# Patient Record
Sex: MALE | Race: WHITE | Employment: FULL TIME | URBAN - NONMETROPOLITAN AREA
[De-identification: names, ages, dates, MRNs, and addresses within clinical notes are randomized per-mention and may not be internally consistent; named-entity substitution may affect disease eponyms.]

---
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
## 2019-05-19 LAB
ALBUMIN SERPL-MCNC: 4.3 G/DL
ALP BLD-CCNC: 112 U/L
ALT SERPL-CCNC: 10 U/L
ANION GAP SERPL CALCULATED.3IONS-SCNC: NORMAL MMOL/L
AST SERPL-CCNC: 22 U/L
BASOPHILS ABSOLUTE: 0 /ΜL
BASOPHILS RELATIVE PERCENT: 0 %
BILIRUB SERPL-MCNC: NORMAL MG/DL (ref 0.1–1.4)
BUN / CREAT RATIO: NORMAL
BUN BLDV-MCNC: 8 MG/DL
BUN BLDV-MCNC: 8 MG/DL
CALCIUM SERPL-MCNC: NORMAL MG/DL
CHLORIDE BLD-SCNC: 103 MMOL/L
CO2: 29 MMOL/L
CREAT SERPL-MCNC: 0.9 MG/DL
CREAT SERPL-MCNC: 0.9 MG/DL
EOSINOPHILS ABSOLUTE: 0 /ΜL
EOSINOPHILS RELATIVE PERCENT: 0 %
GFR CALCULATED: NORMAL
GLUCOSE BLD-MCNC: 116 MG/DL
GLUCOSE FASTING: 116 MG/DL
HCT VFR BLD CALC: 42.5 % (ref 41–53)
HEMOGLOBIN: 15 G/DL (ref 13.5–17.5)
LYMPHOCYTES ABSOLUTE: 1.4 /ΜL
LYMPHOCYTES RELATIVE PERCENT: 0.1 %
MCH RBC QN AUTO: 30.4 PG
MCHC RBC AUTO-ENTMCNC: 35.3 G/DL
MCV RBC AUTO: 86 FL
MONOCYTES ABSOLUTE: 1.1 /ΜL
MONOCYTES RELATIVE PERCENT: 13.6 %
NEUTROPHILS ABSOLUTE: 7.6 /ΜL
NEUTROPHILS RELATIVE PERCENT: 75.3 %
PDW BLD-RTO: 11.4 %
PLATELET # BLD: 198 K/ΜL
PMV BLD AUTO: 10.1 FL
POTASSIUM SERPL-SCNC: 3.4 MMOL/L
RBC # BLD: 4.94 10^6/ΜL
SODIUM BLD-SCNC: 140 MMOL/L
TOTAL PROTEIN: 7.9
WBC # BLD: 10.1 10^3/ML

## 2021-07-29 ENCOUNTER — OFFICE VISIT (OUTPATIENT)
Dept: FAMILY MEDICINE CLINIC | Age: 21
End: 2021-07-29
Payer: COMMERCIAL

## 2021-07-29 VITALS
BODY MASS INDEX: 25.07 KG/M2 | SYSTOLIC BLOOD PRESSURE: 130 MMHG | OXYGEN SATURATION: 98 % | HEIGHT: 71 IN | HEART RATE: 67 BPM | WEIGHT: 179.1 LBS | DIASTOLIC BLOOD PRESSURE: 80 MMHG

## 2021-07-29 DIAGNOSIS — K21.9 GASTROESOPHAGEAL REFLUX DISEASE, UNSPECIFIED WHETHER ESOPHAGITIS PRESENT: ICD-10-CM

## 2021-07-29 DIAGNOSIS — Z76.89 ENCOUNTER TO ESTABLISH CARE: Primary | ICD-10-CM

## 2021-07-29 DIAGNOSIS — Z13.31 POSITIVE DEPRESSION SCREENING: ICD-10-CM

## 2021-07-29 DIAGNOSIS — F41.9 ANXIETY: ICD-10-CM

## 2021-07-29 PROCEDURE — 99204 OFFICE O/P NEW MOD 45 MIN: CPT | Performed by: NURSE PRACTITIONER

## 2021-07-29 PROCEDURE — G8431 POS CLIN DEPRES SCRN F/U DOC: HCPCS | Performed by: NURSE PRACTITIONER

## 2021-07-29 RX ORDER — FLUOXETINE HYDROCHLORIDE 20 MG/1
20 CAPSULE ORAL DAILY
Qty: 30 CAPSULE | Refills: 3 | Status: SHIPPED | OUTPATIENT
Start: 2021-07-29 | End: 2022-01-20

## 2021-07-29 RX ORDER — OMEPRAZOLE 20 MG/1
20 CAPSULE, DELAYED RELEASE ORAL DAILY
Qty: 30 CAPSULE | Refills: 3 | Status: SHIPPED | OUTPATIENT
Start: 2021-07-29 | End: 2022-09-27 | Stop reason: ALTCHOICE

## 2021-07-29 SDOH — ECONOMIC STABILITY: FOOD INSECURITY: WITHIN THE PAST 12 MONTHS, THE FOOD YOU BOUGHT JUST DIDN'T LAST AND YOU DIDN'T HAVE MONEY TO GET MORE.: NEVER TRUE

## 2021-07-29 SDOH — ECONOMIC STABILITY: FOOD INSECURITY: WITHIN THE PAST 12 MONTHS, YOU WORRIED THAT YOUR FOOD WOULD RUN OUT BEFORE YOU GOT MONEY TO BUY MORE.: NEVER TRUE

## 2021-07-29 ASSESSMENT — PATIENT HEALTH QUESTIONNAIRE - PHQ9
4. FEELING TIRED OR HAVING LITTLE ENERGY: 2
7. TROUBLE CONCENTRATING ON THINGS, SUCH AS READING THE NEWSPAPER OR WATCHING TELEVISION: 2
2. FEELING DOWN, DEPRESSED OR HOPELESS: 2
SUM OF ALL RESPONSES TO PHQ QUESTIONS 1-9: 12
5. POOR APPETITE OR OVEREATING: 2
8. MOVING OR SPEAKING SO SLOWLY THAT OTHER PEOPLE COULD HAVE NOTICED. OR THE OPPOSITE, BEING SO FIGETY OR RESTLESS THAT YOU HAVE BEEN MOVING AROUND A LOT MORE THAN USUAL: 0
SUM OF ALL RESPONSES TO PHQ QUESTIONS 1-9: 12
3. TROUBLE FALLING OR STAYING ASLEEP: 0
9. THOUGHTS THAT YOU WOULD BE BETTER OFF DEAD, OR OF HURTING YOURSELF: 0
SUM OF ALL RESPONSES TO PHQ9 QUESTIONS 1 & 2: 4
1. LITTLE INTEREST OR PLEASURE IN DOING THINGS: 2
10. IF YOU CHECKED OFF ANY PROBLEMS, HOW DIFFICULT HAVE THESE PROBLEMS MADE IT FOR YOU TO DO YOUR WORK, TAKE CARE OF THINGS AT HOME, OR GET ALONG WITH OTHER PEOPLE: 0
SUM OF ALL RESPONSES TO PHQ QUESTIONS 1-9: 12
6. FEELING BAD ABOUT YOURSELF - OR THAT YOU ARE A FAILURE OR HAVE LET YOURSELF OR YOUR FAMILY DOWN: 2

## 2021-07-29 ASSESSMENT — COLUMBIA-SUICIDE SEVERITY RATING SCALE - C-SSRS
1. WITHIN THE PAST MONTH, HAVE YOU WISHED YOU WERE DEAD OR WISHED YOU COULD GO TO SLEEP AND NOT WAKE UP?: NO
2. HAVE YOU ACTUALLY HAD ANY THOUGHTS OF KILLING YOURSELF?: NO
6. HAVE YOU EVER DONE ANYTHING, STARTED TO DO ANYTHING, OR PREPARED TO DO ANYTHING TO END YOUR LIFE?: NO

## 2021-07-29 ASSESSMENT — ENCOUNTER SYMPTOMS
WHEEZING: 0
BELCHING: 1
HEARTBURN: 0
NAUSEA: 1
ABDOMINAL PAIN: 1
SORE THROAT: 0
COUGH: 0

## 2021-07-29 ASSESSMENT — SOCIAL DETERMINANTS OF HEALTH (SDOH): HOW HARD IS IT FOR YOU TO PAY FOR THE VERY BASICS LIKE FOOD, HOUSING, MEDICAL CARE, AND HEATING?: NOT HARD AT ALL

## 2021-07-29 NOTE — PATIENT INSTRUCTIONS

## 2021-07-29 NOTE — PROGRESS NOTES
[address] [facility]  [address]  Dept: [phone]  Dept Fax: [phone]    Chief Complaint   Patient presents with    New Patient     states has some weeks were he feels good and other weeks not so much finds he isn't motiviated, states on his good days will go to work, then work out and feels motivated. HPI:   Patient presents today to establish care. He has a past medical history of focal seizure, last seizure 10 years ago. States he was previously on Trileptal, and discharged from his neurologist. He works [employer] full time. He works out at [employer] in [employer] 2-3 times weekly. Gastroesophageal Reflux  He complains of abdominal pain (upper), belching, early satiety and nausea. He reports no chest pain, no coughing, no heartburn, no sore throat or no wheezing. This is a new problem. Episode onset: 2 months. The problem occurs frequently. The problem has been unchanged. The symptoms are aggravated by ETOH, smoking and certain foods (occasional). Associated symptoms include fatigue. Risk factors include ETOH use and smoking/tobacco exposure. Treatments tried: GasX. The treatment provided moderate relief. Mental Health Problem   The primary symptoms include: depression and anxiety. he has the following symptoms: fatigue and irritable. Onset of symptoms was approximately 6 months ago. Symptoms have been unchanged since that time. he denies current suicidal and homicidal ideation. Family history significant for anxiety. Risk factors: positive family history in  mother. Previous treatment includes: none. Appetite: fluctuates  Sleep disturbance: No  Fatigue: Yes  Loss of pleasure: sometimes  Impulsive behavior: Yes  Memory: recent and remote memory intact  Attention/Concentration: intact  Suicide Assessment: no suicidal ideation  Has never had counseling.     BP Readings from Last 3 Encounters:   07/29/21 130/80   06/13/17 138/72        Pulse Readings from Last 3 Encounters:   07/29/21 67   06/13/17 100        Wt Readings from Last 3 Encounters:   07/29/21 179 lb 1.6 oz (81.2 kg)   06/13/17 177 lb (80.3 kg) (81 %, Z= 0.88)*     * Growth percentiles are based on [facility] (Boys, 2-20 Years) data. Current Outpatient Medications   Medication Sig Dispense Refill    FLUoxetine (PROZAC) 20 MG capsule Take 1 capsule by mouth daily 30 capsule 3    omeprazole (PRILOSEC) 20 MG delayed release capsule Take 1 capsule by mouth daily 30 capsule 3    naproxen (NAPROSYN) 500 MG tablet Take 1 tablet by mouth 2 times daily (with meals) 60 tablet 1     No current facility-administered medications for this visit. No past medical history on file. No past surgical history on file. Family History   Problem Relation Age of Onset    High Blood Pressure Mother     High Blood Pressure Brother      Social History     Socioeconomic History    Marital status: Single     Spouse name: Not on file    Number of children: Not on file    Years of education: Not on file    Highest education level: Not on file   Occupational History    Not on file   Tobacco Use    Smoking status: Never Smoker    Smokeless tobacco: Never Used   Substance and Sexual Activity    Alcohol use: No    Drug use: No    Sexual activity: Not on file   Other Topics Concern    Not on file   Social History Narrative    Not on file     Social Determinants of Health     Financial Resource Strain: Low Risk     Difficulty of Paying Living Expenses: Not hard at all   Food Insecurity: No Food Insecurity    Worried About Running Out of Food in the Last Year: Never true    Lisa of Food in the Last Year: Never true   Transportation Needs:     Lack of Transportation (Medical):      Lack of Transportation (Non-Medical):    Physical Activity:     Days of Exercise per Week:     Minutes of Exercise per Session:    Stress:     Feeling of Stress :    Social Connections:     Frequency of Communication with Friends and Family:     Frequency of Social Gatherings with Friends and Family:     Attends Buddhist Services:     Active Member of Clubs or Organizations:     Attends Club or Organization Meetings:     Marital Status:    Intimate Partner Violence:     Fear of Current or Ex-Partner:     Emotionally Abused:     Physically Abused:     Sexually Abused: Allergies   Allergen Reactions    Penicillin G      Other reaction(s): Unknown       There is no problem list on file for this patient. Health Maintenance   Topic Date Due    Hepatitis C screen  Never done    Varicella vaccine (1 of 2 - 2-dose childhood series) Never done    HPV vaccine (1 - Male 2-dose series) Never done    COVID-19 Vaccine (1) Never done    HIV screen  Never done    DTaP/Tdap/Td vaccine (1 - Tdap) Never done    Flu vaccine (1) 09/01/2021    Hepatitis A vaccine  Aged Out    Hepatitis B vaccine  Aged Out    Hib vaccine  Aged Out    Meningococcal (ACWY) vaccine  Aged Out    Pneumococcal 0-64 years Vaccine  Aged Out       Subjective:     Review of Systems   Constitutional: Positive for fatigue. Negative for chills and fever. HENT: Negative. Negative for sore throat. Respiratory: Negative for cough, shortness of breath and wheezing. Cardiovascular: Negative for chest pain, palpitations and leg swelling. Gastrointestinal: Positive for abdominal pain (upper) and nausea. Negative for constipation, diarrhea and heartburn. Endocrine: Negative for cold intolerance, heat intolerance, polydipsia, polyphagia and polyuria. Genitourinary: Negative for difficulty urinating. Musculoskeletal: Negative for arthralgias, myalgias and neck pain. Neurological: Negative for dizziness, weakness and headaches. Psychiatric/Behavioral: Positive for agitation and dysphoric mood. Negative for self-injury, sleep disturbance and suicidal ideas. The patient is nervous/anxious.         Objective:     Vitals: 07/29/21 0803   BP: 130/80   Pulse: 67   SpO2: 98%   Weight: 179 lb 1.6 oz (81.2 kg)   Height: 5' 11.26\" (1.81 m)        Estimated body mass index is 24.8 kg/m² as calculated from the following:    Height as of this encounter: 5' 11.26\" (1.81 m). Weight as of this encounter: 179 lb 1.6 oz (81.2 kg). Physical Exam  Constitutional:       Appearance: Normal appearance. He is well-developed and well-groomed. HENT:      Head: Normocephalic. Right Ear: Tympanic membrane and ear canal normal.      Left Ear: Tympanic membrane and ear canal normal.      Nose: Nose normal.      Mouth/Throat:      Mouth: Mucous membranes are moist.   Eyes:      Conjunctiva/sclera: Conjunctivae normal.      Pupils: Pupils are equal, round, and reactive to light. Neck:      Thyroid: No thyromegaly. Cardiovascular:      Rate and Rhythm: Normal rate and regular rhythm. Heart sounds: Normal heart sounds. Pulmonary:      Effort: Pulmonary effort is normal.      Breath sounds: Normal breath sounds. No wheezing. Abdominal:      General: Bowel sounds are normal.      Palpations: Abdomen is soft. Tenderness: There is abdominal tenderness in the epigastric area. Musculoskeletal:      Cervical back: Neck supple. Right lower leg: No edema. Left lower leg: No edema. Lymphadenopathy:      Cervical: No cervical adenopathy. Skin:     Capillary Refill: Capillary refill takes less than 2 seconds. Neurological:      Mental Status: He is alert and oriented to person, place, and time. Psychiatric:         Mood and Affect: Mood is anxious and depressed. Behavior: Behavior is cooperative. PHQ Scores 7/29/2021   PHQ2 Score 4   PHQ9 Score 12     Interpretation of Total Score Depression Severity: 1-4 = Minimal depression, 5-9 = Mild depression, 10-14 = Moderate depression, 15-19 = Moderately severe depression, 20-27 = Severe depression     Assessment:     1. Encounter to establish care    2.  Anxiety 3. Gastroesophageal reflux disease, unspecified whether esophagitis present    4. Positive depression screening        Plan:     Orders Placed This Encounter   Medications    FLUoxetine (PROZAC) 20 MG capsule     Sig: Take 1 capsule by mouth daily     Dispense:  30 capsule     Refill:  3    omeprazole (PRILOSEC) 20 MG delayed release capsule     Sig: Take 1 capsule by mouth daily     Dispense:  30 capsule     Refill:  3       Orders Placed This Encounter   Procedures    Positive Screen for Clinical Depression with a Documented Follow-up Plan      Instructed to start taking fluoxetine 20 mg daily, and omeprazole 20 mg daily. Encouraged healthy diet and routine exercise. Instructed to continue current medications. All patient questions answered. Pt voiced understanding. Health Maintenance reviewed. Patient agreed with treatment plan. Follow up as directed. Return in about 4 weeks (around 8/26/2021). This note was generated completely or in part utilizing Dragon dictation speech recognition software. Occasionally, words are mistranscribed and despite editing, the text may contain inaccuracies due to incorrect word recognition. If further clarification is needed please contact the office at [phone].     Electronically signed by TATA Feliciano CNP on 8/6/2021 at 8:49 PM.

## 2021-08-06 ASSESSMENT — ENCOUNTER SYMPTOMS
CONSTIPATION: 0
SHORTNESS OF BREATH: 0
DIARRHEA: 0

## 2021-08-11 ENCOUNTER — NURSE TRIAGE (OUTPATIENT)
Dept: OTHER | Facility: CLINIC | Age: 21
End: 2021-08-11

## 2021-08-11 NOTE — TELEPHONE ENCOUNTER
Received call from [facility]  at [facility] with [employer] Complaint. Brief description of triage: Mom called in for son who is not with her about abd pain that pt has been having and weight loss. Mom was calling to reschedule an appt he already has and was sent to NT to do assessment. Mom disconnected prior to being transferred. Writer attempted to call pt back at his number and he did not answer and mailbox was full. NO TRIAGE AT THIS TIME>     Attention Provider: Thank you for allowing me to participate in the care of your patient. The patient was connected to triage in response to information provided to the [facility]. Please do not respond through this encounter as the response is not directed to a shared pool.         Reason for Disposition   Caller has cancelled the call before the first contact    Protocols used: NO CONTACT OR DUPLICATE CONTACT CALL-ADULT-

## 2021-08-17 PROBLEM — F41.8 MIXED ANXIETY AND DEPRESSIVE DISORDER: Status: ACTIVE | Noted: 2021-08-17

## 2021-08-17 PROBLEM — K21.9 GASTROESOPHAGEAL REFLUX DISEASE: Status: ACTIVE | Noted: 2021-08-17

## 2021-08-17 NOTE — PROGRESS NOTES
[address] [address] [facility]  Dept: [phone]  Dept Fax: [phone]    Tomeka Ramos is a 19 y.o. male who presents today for his medical conditions/complaints as noted below. Tomeka Ramos c/o of Follow-up (gerd and anxiety last visit instructed to Instructed to start taking fluoxetine 20 mg daily, and omeprazole 20 mg daily states meds have been helpful but does have some stomach issues in morning and then will subside but then will come back appetite diminished some )      HPI:   Patient presents to the office for 4-week follow-up of anxiety, depression, and GERD. He was started on fluoxetine 20 mg daily, and omeprazole 20 mg daily. Patient states he took the omeprazole for 1 week and then stopped after symptoms did not improve. Today he reports feeling better with taking the fluoxetine. He still having nausea in the morning which typically last 1 to 2 minutes. Mental Health Problem   The primary symptoms include dysphoric mood. he has the following symptoms: irritable and nausea. Onset of symptoms was approximately 7 months ago. Symptoms have been unchanged since that time. he denies current suicidal and homicidal ideation. Family history significant for anxiety. Risk factors: positive family history in  mother. Current treatment includes Prozac. he complains of the following medication side effects: none.     Appetite: decreased  Sleep disturbance: No  Fatigue: No  Loss of pleasure: sometimes  Impulsive behavior: No  Memory: recent and remote memory intact  Attention/Concentration: intact  Suicide Assessment: no suicidal ideation      BP Readings from Last 3 Encounters:   08/18/21 136/86   07/29/21 130/80   06/13/17 138/72              ([id number])    Pulse Readings from Last 3 Encounters:   08/18/21 56   07/29/21 67   06/13/17 100        Wt Readings from Last 3 Encounters:   08/18/21 176 lb 11.2 oz (80.2 kg)   07/29/21 179 lb 1.6 oz (81.2 kg)   06/13/17 177 lb (80.3 kg) (81 %, Z= 0.88)*     * Growth percentiles are based on CDC (Boys, 2-20 Years) data. History reviewed. No pertinent past medical history. No past surgical history on file. Family History   Problem Relation Age of Onset    High Blood Pressure Mother     High Blood Pressure Brother        Social History     Tobacco Use    Smoking status: Never Smoker    Smokeless tobacco: Never Used   Substance Use Topics    Alcohol use: No      Current Outpatient Medications   Medication Sig Dispense Refill    FLUoxetine (PROZAC) 20 MG capsule Take 1 capsule by mouth daily 30 capsule 3    omeprazole (PRILOSEC) 20 MG delayed release capsule Take 1 capsule by mouth daily 30 capsule 3    naproxen (NAPROSYN) 500 MG tablet Take 1 tablet by mouth 2 times daily (with meals) 60 tablet 1     No current facility-administered medications for this visit. Allergies   Allergen Reactions    Penicillin G      Other reaction(s): Unknown       Health Maintenance   Topic Date Due    Hepatitis C screen  Never done    Varicella vaccine (1 of 2 - 2-dose childhood series) Never done    HPV vaccine (1 - Male 2-dose series) Never done    COVID-19 Vaccine (1) Never done    HIV screen  Never done    DTaP/Tdap/Td vaccine (1 - Tdap) Never done    Flu vaccine (1) 09/01/2021    Hepatitis A vaccine  Aged Out    Hepatitis B vaccine  Aged Out    Hib vaccine  Aged Out    Meningococcal (ACWY) vaccine  Aged Out    Pneumococcal 0-64 years Vaccine  Aged Out       Subjective:      Review of Systems   Constitutional: Positive for appetite change (decreased). Negative for chills, fatigue and fever. HENT: Negative. Eyes: Negative. Respiratory: Negative for cough, shortness of breath and wheezing. Cardiovascular: Negative for chest pain, palpitations and leg swelling. Gastrointestinal: Positive for nausea (in the morning). Negative for abdominal pain, constipation and diarrhea. Genitourinary: Negative. Musculoskeletal: Negative for arthralgias and myalgias. Skin: Negative. Allergic/Immunologic: Negative for environmental allergies and food allergies. Neurological: Negative for dizziness, weakness, light-headedness and headaches. Psychiatric/Behavioral: Positive for agitation and dysphoric mood. Negative for self-injury, sleep disturbance and suicidal ideas. The patient is nervous/anxious. Objective:     Vitals:    08/18/21 1712   BP: 136/86   Pulse: 56   SpO2: 98%   Weight: 176 lb 11.2 oz (80.2 kg)        Estimated body mass index is 24.47 kg/m² as calculated from the following:    Height as of 7/29/21: 5' 11.26\" (1.81 m). Weight as of this encounter: 176 lb 11.2 oz (80.2 kg). Physical Exam  Constitutional:       Appearance: Normal appearance. He is well-developed and well-groomed. HENT:      Head: Normocephalic. Eyes:      Conjunctiva/sclera: Conjunctivae normal.   Neck:      Thyroid: No thyromegaly. Cardiovascular:      Rate and Rhythm: Normal rate and regular rhythm. Heart sounds: Normal heart sounds. Pulmonary:      Effort: Pulmonary effort is normal.      Breath sounds: Normal breath sounds. No wheezing. Abdominal:      General: Bowel sounds are normal.      Palpations: Abdomen is soft. Tenderness: There is no abdominal tenderness. Musculoskeletal:      Cervical back: Neck supple. Right lower leg: No edema. Left lower leg: No edema. Lymphadenopathy:      Cervical: No cervical adenopathy. Skin:     Capillary Refill: Capillary refill takes less than 2 seconds. Neurological:      Mental Status: He is alert and oriented to person, place, and time. Psychiatric:         Mood and Affect: Mood is anxious and depressed. Behavior: Behavior is cooperative.          PHQ Scores 7/29/2021   PHQ2 Score 4   PHQ9 Score 12     Interpretation of Total Score Depression Severity: 1-4 = Minimal depression, 5-9 = Mild depression, 10-14 = Moderate depression, 15-19 = Moderately severe depression, 20-27 = Severe depression     Lab Results   Component Value Date     05/19/2019    K 3.4 05/19/2019     05/19/2019    CO2 29 05/19/2019    BUN 8 05/19/2019    BUN 8 05/19/2019    CREATININE 0.90 05/19/2019    CREATININE 0.90 05/19/2019    GLUCOSE 116 05/19/2019    LABALBU 4.3 05/19/2019    ALKPHOS 112 05/19/2019    AST 22 05/19/2019    ALT 10 05/19/2019     No results found for: LABA1C    Lab Results   Component Value Date    GLUF 116 05/19/2019    CREATININE 0.90 05/19/2019    CREATININE 0.90 05/19/2019       Assessment:     1. Mixed anxiety and depressive disorder    2. Gastroesophageal reflux disease, unspecified whether esophagitis present        Plan:     Overall patient reports feeling better with taking fluoxetine. Instructed to continue taking medication, including omeprazole 20 mg daily 30 minutes before breakfast.  Discussed use, benefit, and side effects of prescribed medications. All patient questions answered. Patient voiced understanding. Health Maintenance reviewed. Instructed to continue current medications, diet and exercise. Patient agreed with treatment plan. Follow up as directed. Return if symptoms worsen or fail to improve. This note was generated completely or in part utilizing Dragon dictation speech recognition software. Occasionally, words are mistranscribed and despite editing, the text may contain inaccuracies due to incorrect word recognition. If further clarification is needed please contact the office at [phone].     Electronically signed by TATA Amaral CNP on 8/25/2021

## 2021-08-18 ENCOUNTER — OFFICE VISIT (OUTPATIENT)
Dept: FAMILY MEDICINE CLINIC | Age: 21
End: 2021-08-18
Payer: COMMERCIAL

## 2021-08-18 VITALS
SYSTOLIC BLOOD PRESSURE: 136 MMHG | HEART RATE: 56 BPM | WEIGHT: 176.7 LBS | BODY MASS INDEX: 24.47 KG/M2 | OXYGEN SATURATION: 98 % | DIASTOLIC BLOOD PRESSURE: 86 MMHG

## 2021-08-18 DIAGNOSIS — K21.9 GASTROESOPHAGEAL REFLUX DISEASE, UNSPECIFIED WHETHER ESOPHAGITIS PRESENT: ICD-10-CM

## 2021-08-18 DIAGNOSIS — F41.8 MIXED ANXIETY AND DEPRESSIVE DISORDER: Primary | ICD-10-CM

## 2021-08-18 PROCEDURE — 99214 OFFICE O/P EST MOD 30 MIN: CPT | Performed by: NURSE PRACTITIONER

## 2021-08-25 ASSESSMENT — ENCOUNTER SYMPTOMS
NAUSEA: 1
DIARRHEA: 0
EYES NEGATIVE: 1
CONSTIPATION: 0
COUGH: 0
SHORTNESS OF BREATH: 0
WHEEZING: 0
ABDOMINAL PAIN: 0

## 2021-09-22 LAB
ANION GAP SERPL CALCULATED.3IONS-SCNC: 15.7 MMOL/L
BASOPHILS %: 0.89 (ref 0–3)
BASOPHILS ABSOLUTE: 0.05 (ref 0–0.3)
BUN BLDV-MCNC: 15 MG/DL (ref 9–20)
CALCIUM SERPL-MCNC: 10.2 MG/DL (ref 8.4–10.2)
CHLORIDE BLD-SCNC: 102 MMOL/L (ref 98–120)
CO2: 30 MMOL/L (ref 22–31)
CREAT SERPL-MCNC: 0.9 MG/DL (ref 0.7–1.3)
EOSINOPHILS %: 0.42 (ref 0–10)
EOSINOPHILS ABSOLUTE: 0.02 (ref 0–1.1)
GFR CALCULATED: > 60
GLUCOSE: 91 MG/DL (ref 75–110)
HCT VFR BLD CALC: 45.9 % (ref 42–52)
HEMOGLOBIN: 15.8 (ref 13.8–17.8)
LYMPHOCYTE %: 30.38 (ref 20–51.1)
LYMPHOCYTES ABSOLUTE: 1.63 (ref 1–5.5)
MCH RBC QN AUTO: 29.9 PG (ref 28.5–32.5)
MCHC RBC AUTO-ENTMCNC: 34.5 G/DL (ref 32–37)
MCV RBC AUTO: 86.5 FL (ref 80–94)
MONOCYTES %: 10.44 (ref 1.7–9.3)
MONOCYTES ABSOLUTE: 0.56 (ref 0.1–1)
NEUTROPHILS %: 57.87 (ref 42.2–75.2)
NEUTROPHILS ABSOLUTE: 3.11 (ref 2–8.1)
PDW BLD-RTO: 10.9 % (ref 10–15.5)
PLATELET # BLD: 222.2 THOU/MM3 (ref 130–400)
POTASSIUM SERPL-SCNC: 5.7 MMOL/L (ref 3.6–5)
RBC: 5.31 M/UL (ref 4.7–6.1)
SODIUM BLD-SCNC: 142 MMOL/L (ref 135–145)
TSH SERPL DL<=0.05 MIU/L-ACNC: 0.41 MIU/ML (ref 0.49–4.67)
WBC: 5.4 THOU/ML3 (ref 4.8–10.8)

## 2021-09-28 DIAGNOSIS — E87.5 HYPERKALEMIA: Primary | ICD-10-CM

## 2021-09-28 DIAGNOSIS — R79.89 ABNORMAL TSH: ICD-10-CM

## 2021-10-07 LAB
POTASSIUM SERPL-SCNC: 4.4 MMOL/L (ref 3.6–5)
T3 TOTAL: 110 NG/DL (ref 60–181)
T4 FREE: 0.94 NG/DL (ref 0.78–2.19)
TSH SERPL DL<=0.05 MIU/L-ACNC: 0.74 MIU/ML (ref 0.49–4.67)

## 2022-01-20 DIAGNOSIS — F41.9 ANXIETY: ICD-10-CM

## 2022-01-20 RX ORDER — FLUOXETINE HYDROCHLORIDE 20 MG/1
CAPSULE ORAL
Qty: 30 CAPSULE | Refills: 3 | Status: SHIPPED | OUTPATIENT
Start: 2022-01-20 | End: 2022-03-22 | Stop reason: ALTCHOICE

## 2022-01-20 NOTE — TELEPHONE ENCOUNTER
Aroldo Patel is requesting a refill on the following medication(s):  Requested Prescriptions     Pending Prescriptions Disp Refills    FLUoxetine (PROZAC) 20 MG capsule [Pharmacy Med Name: FLUOXETINE HCL 20 MG CAPSULE] 30 capsule 3     Sig: take 1 capsule by mouth once daily       Last Visit Date (If Applicable):  4/86/4714    Next Visit Date:    Visit date not found

## 2022-03-22 ENCOUNTER — OFFICE VISIT (OUTPATIENT)
Dept: FAMILY MEDICINE CLINIC | Age: 22
End: 2022-03-22
Payer: COMMERCIAL

## 2022-03-22 VITALS
SYSTOLIC BLOOD PRESSURE: 132 MMHG | WEIGHT: 195.5 LBS | DIASTOLIC BLOOD PRESSURE: 78 MMHG | BODY MASS INDEX: 27.07 KG/M2 | HEART RATE: 69 BPM | OXYGEN SATURATION: 97 %

## 2022-03-22 DIAGNOSIS — K21.9 GASTROESOPHAGEAL REFLUX DISEASE, UNSPECIFIED WHETHER ESOPHAGITIS PRESENT: ICD-10-CM

## 2022-03-22 DIAGNOSIS — F41.8 MIXED ANXIETY AND DEPRESSIVE DISORDER: Primary | ICD-10-CM

## 2022-03-22 DIAGNOSIS — G25.81 RESTLESS LEGS: ICD-10-CM

## 2022-03-22 PROCEDURE — 99213 OFFICE O/P EST LOW 20 MIN: CPT | Performed by: NURSE PRACTITIONER

## 2022-03-22 PROCEDURE — 99212 OFFICE O/P EST SF 10 MIN: CPT | Performed by: NURSE PRACTITIONER

## 2022-03-22 ASSESSMENT — ENCOUNTER SYMPTOMS
BELCHING: 0
CONSTIPATION: 0
NAUSEA: 0
ABDOMINAL PAIN: 0
WHEEZING: 0
SHORTNESS OF BREATH: 0
DIARRHEA: 0
SORE THROAT: 0
COUGH: 0
HEARTBURN: 0

## 2022-03-22 ASSESSMENT — PATIENT HEALTH QUESTIONNAIRE - PHQ9
SUM OF ALL RESPONSES TO PHQ9 QUESTIONS 1 & 2: 0
1. LITTLE INTEREST OR PLEASURE IN DOING THINGS: 0
SUM OF ALL RESPONSES TO PHQ QUESTIONS 1-9: 0
SUM OF ALL RESPONSES TO PHQ QUESTIONS 1-9: 0
2. FEELING DOWN, DEPRESSED OR HOPELESS: 0
SUM OF ALL RESPONSES TO PHQ QUESTIONS 1-9: 0
SUM OF ALL RESPONSES TO PHQ QUESTIONS 1-9: 0

## 2022-03-22 NOTE — PROGRESS NOTES
[address] [address] [address]  Dept: [phone]  Dept Fax: [phone]    History and Physical  Patient:  Karly Torres  YOB: 2000  Date of Service:  3/22/2022    Subjective:   Karly Torres (:  2000) is a 19 y.o. male, Established patient, here for evaluation of the following chief complaint(s):    Chief Complaint   Patient presents with    Gastroesophageal Reflux     states since starting omeprazole symptoms have improved tremendously     Anxiety     reports doing well states fluoxetine had helped but does not feel as effective    Depression    Other     having issues with RLS at night says has the feeling of having to constantly move legs no cramping      States he felt much better, more relaxed the first month he was taking fluoxetine. He is not so sure it is working as well anymore. He is also feeling like he has to move his legs at night, feels restless. He eventually gets up moves around. Occurs only at night when he is tired. Restless legs started 2 months ago and seems to be worsening over the past 2 weeks. Gastroesophageal Reflux  He reports no abdominal pain, no belching, no chest pain, no coughing, no dysphagia, no heartburn, no nausea, no sore throat or no wheezing. This is a chronic problem. The current episode started more than 1 month ago. The problem occurs occasionally. The problem has been rapidly improving. The symptoms are aggravated by certain foods. Pertinent negatives include no anemia, fatigue, melena, muscle weakness, orthopnea or weight loss. There are no known risk factors. He has tried a PPI for the symptoms. The treatment provided moderate relief. The ASCVD Risk score (Rajwinder Browne, et al., 2013) failed to calculate for the following reasons:     The 2013 ASCVD risk score is only valid for ages 36 to 78     BP Readings from Last 3 Encounters:   22 132/78   21 136/86 07/29/21 130/80      Pulse Readings from Last 3 Encounters:   03/22/22 69   08/18/21 56   07/29/21 67      Wt Readings from Last 3 Encounters:   03/22/22 195 lb 8 oz (88.7 kg)   08/18/21 176 lb 11.2 oz (80.2 kg)   07/29/21 179 lb 1.6 oz (81.2 kg)        Allergies   Allergen Reactions    Penicillin G      Other reaction(s): Unknown       Current Outpatient Medications   Medication Sig Dispense Refill    sertraline (ZOLOFT) 50 MG tablet Take 1 tablet by mouth daily 30 tablet 2    omeprazole (PRILOSEC) 20 MG delayed release capsule Take 1 capsule by mouth daily 30 capsule 3    naproxen (NAPROSYN) 500 MG tablet Take 1 tablet by mouth 2 times daily (with meals) 60 tablet 1     No current facility-administered medications for this visit. History reviewed. No pertinent past medical history. No past surgical history on file. Family History   Problem Relation Age of Onset    High Blood Pressure Mother     High Blood Pressure Brother      Social History     Tobacco Use    Smoking status: Never Smoker    Smokeless tobacco: Never Used   Substance Use Topics    Alcohol use: No    Drug use: No       Review of Systems:     Review of Systems   Constitutional: Negative for appetite change, chills, fatigue, fever and weight loss. HENT: Negative. Negative for sore throat. Respiratory: Negative for cough, shortness of breath and wheezing. Cardiovascular: Negative for chest pain, palpitations and leg swelling. Gastrointestinal: Negative for abdominal pain, constipation, diarrhea, dysphagia, heartburn, melena and nausea. Genitourinary: Negative. Musculoskeletal: Negative for muscle weakness. Restless legs   Allergic/Immunologic: Negative for environmental allergies and food allergies. Neurological: Negative for dizziness, light-headedness and headaches. Psychiatric/Behavioral: Negative for agitation, dysphoric mood, self-injury, sleep disturbance and suicidal ideas.  The patient is nervous/anxious. PHQ Scores 3/22/2022 7/29/2021   PHQ2 Score 0 4   PHQ9 Score 0 12     Interpretation of Total Score Depression Severity: 1-4 = Minimal depression, 5-9 = Mild depression, 10-14 = Moderate depression, 15-19 = Moderately severe depression, 20-27 = Severe depression     Physical Exam:     Vitals:    03/22/22 0929   BP: 132/78   Pulse: 69   SpO2: 97%   Weight: 195 lb 8 oz (88.7 kg)      Body mass index is 27.07 kg/m². Physical Exam  Constitutional:       Appearance: Normal appearance. He is well-developed and well-groomed. HENT:      Head: Normocephalic. Eyes:      Conjunctiva/sclera: Conjunctivae normal.   Neck:      Thyroid: No thyromegaly. Vascular: No carotid bruit. Cardiovascular:      Rate and Rhythm: Normal rate and regular rhythm. Heart sounds: Normal heart sounds. Pulmonary:      Effort: Pulmonary effort is normal.      Breath sounds: Normal breath sounds. No wheezing. Musculoskeletal:      Cervical back: Neck supple. Right lower leg: No edema. Left lower leg: No edema. Lymphadenopathy:      Cervical: No cervical adenopathy. Skin:     Capillary Refill: Capillary refill takes less than 2 seconds. Neurological:      Mental Status: He is alert and oriented to person, place, and time. Gait: Gait normal.   Psychiatric:         Mood and Affect: Mood is anxious. Behavior: Behavior is cooperative. Assessment/Plan:   1. Mixed anxiety and depressive disorder  Assessment & Plan:   Discontinue the fluoxetine and start sertraline 50 mg daily as discussed. Orders:  -     sertraline (ZOLOFT) 50 MG tablet; Take 1 tablet by mouth daily, Disp-30 tablet, R-2Normal  2. Gastroesophageal reflux disease, unspecified whether esophagitis present  Assessment & Plan:   Well-controlled, continue current medications  3. Restless legs  Comments:  Change SSRI to see if symptoms improve. All patient questions answered. Patient voiced understanding.

## 2022-09-27 ENCOUNTER — OFFICE VISIT (OUTPATIENT)
Dept: FAMILY MEDICINE CLINIC | Age: 22
End: 2022-09-27
Payer: COMMERCIAL

## 2022-09-27 VITALS
DIASTOLIC BLOOD PRESSURE: 88 MMHG | WEIGHT: 188 LBS | SYSTOLIC BLOOD PRESSURE: 132 MMHG | HEART RATE: 67 BPM | OXYGEN SATURATION: 97 % | BODY MASS INDEX: 26.03 KG/M2

## 2022-09-27 DIAGNOSIS — F41.8 MIXED ANXIETY AND DEPRESSIVE DISORDER: Primary | ICD-10-CM

## 2022-09-27 PROCEDURE — 99213 OFFICE O/P EST LOW 20 MIN: CPT | Performed by: NURSE PRACTITIONER

## 2022-09-27 ASSESSMENT — ANXIETY QUESTIONNAIRES
GAD7 TOTAL SCORE: 20
4. TROUBLE RELAXING: 3
IF YOU CHECKED OFF ANY PROBLEMS ON THIS QUESTIONNAIRE, HOW DIFFICULT HAVE THESE PROBLEMS MADE IT FOR YOU TO DO YOUR WORK, TAKE CARE OF THINGS AT HOME, OR GET ALONG WITH OTHER PEOPLE: SOMEWHAT DIFFICULT
2. NOT BEING ABLE TO STOP OR CONTROL WORRYING: 3
1. FEELING NERVOUS, ANXIOUS, OR ON EDGE: 3
3. WORRYING TOO MUCH ABOUT DIFFERENT THINGS: 3
7. FEELING AFRAID AS IF SOMETHING AWFUL MIGHT HAPPEN: 3
5. BEING SO RESTLESS THAT IT IS HARD TO SIT STILL: 2
6. BECOMING EASILY ANNOYED OR IRRITABLE: 3

## 2022-09-27 NOTE — PROGRESS NOTES
[address] [address] [address]  Dept: [phone]  Dept Fax: [phone]    History and Physical  Patient:  Carolyn Donnelly  YOB: 2000  Date of Service:  2022    Assessment/Plan:   1. Mixed anxiety and depressive disorder  -     sertraline (ZOLOFT) 50 MG tablet; Take 1 tablet by mouth daily, Disp-30 tablet, R-3Normal      Start sertraline 50 mg daily. I've explained to him that drugs of the SSRI class can have side effects such as weight gain, sexual dysfunction, insomnia, headache, nausea. These medications are generally effective at alleviating symptoms of anxiety and/or depression. Let me know if significant side effects do occur. All patient questions answered. Patient voiced understanding. Instructed to continue current medications. Patient agreed with treatment plan. Follow up as directed. Return in about 6 weeks (around 2022). Subjective:   Carolyn Donnelly (:  2000) is a 23 y.o. male, Established patient, here for evaluation of the following chief complaint(s):    Chief Complaint   Patient presents with    Anxiety     States no longer using medication and doing well       HPI  Stopped taking all medications 6 months ago. He had a lot going on last week and had increased anxiety and stress. The ASCVD Risk score (Randy DK, et al., 2019) failed to calculate for the following reasons:     The 2019 ASCVD risk score is only valid for ages 36 to 78     BP Readings from Last 3 Encounters:   22 132/88   22 132/78   21 136/86      Pulse Readings from Last 3 Encounters:   22 67   22 69   21 56      Wt Readings from Last 3 Encounters:   22 188 lb (85.3 kg)   22 195 lb 8 oz (88.7 kg)   21 176 lb 11.2 oz (80.2 kg)        Allergies   Allergen Reactions    Penicillin G      Other reaction(s): Unknown       Current Outpatient Medications   Medication Sig Dispense Refill    sertraline (ZOLOFT) 50 MG tablet Take 1 tablet by mouth daily 30 tablet 3     No current facility-administered medications for this visit. History reviewed. No pertinent past medical history. No past surgical history on file. Family History   Problem Relation Age of Onset    High Blood Pressure Mother     High Blood Pressure Brother      Social History     Tobacco Use    Smoking status: Never    Smokeless tobacco: Never   Substance Use Topics    Alcohol use: No    Drug use: No       Review of Systems:     Review of Systems   Constitutional:  Positive for fatigue. HENT: Negative. Respiratory: Negative. Cardiovascular: Negative. Gastrointestinal: Negative. Genitourinary: Negative. Neurological: Negative. Psychiatric/Behavioral:  Positive for agitation and decreased concentration. Negative for dysphoric mood, self-injury, sleep disturbance and suicidal ideas. The patient is nervous/anxious. PHQ Scores 3/22/2022 7/29/2021   PHQ2 Score 0 4   PHQ9 Score 0 12     Interpretation of Total Score Depression Severity: 1-4 = Minimal depression, 5-9 = Mild depression, 10-14 = Moderate depression, 15-19 = Moderately severe depression, 20-27 = Severe depression     Physical Exam:     Vitals:    09/27/22 1609 09/27/22 1653   BP: (!) 144/90 132/88   Pulse: 67    SpO2: 97%    Weight: 188 lb (85.3 kg)       Body mass index is 26.03 kg/m². Physical Exam  Constitutional:       Appearance: Normal appearance. He is well-developed and well-groomed. HENT:      Head: Normocephalic. Eyes:      Conjunctiva/sclera: Conjunctivae normal.   Neck:      Thyroid: No thyromegaly. Vascular: No carotid bruit. Cardiovascular:      Rate and Rhythm: Normal rate and regular rhythm. Heart sounds: Normal heart sounds. Pulmonary:      Effort: Pulmonary effort is normal.      Breath sounds: Normal breath sounds. No wheezing. Musculoskeletal:      Cervical back: Neck supple.    Lymphadenopathy: Cervical: No cervical adenopathy. Skin:     Capillary Refill: Capillary refill takes less than 2 seconds. Neurological:      Mental Status: He is alert and oriented to person, place, and time. Gait: Gait normal.   Psychiatric:         Mood and Affect: Mood is anxious. Behavior: Behavior is cooperative. Please note that this chart was generated using voice recognition Dragon dictation software. Although every effort was made to ensure the accuracy of this automated transcription, some errors in transcription may have occurred.     Electronically signed by TATA Peralta CNP on 10/9/2022

## 2022-10-09 ASSESSMENT — ENCOUNTER SYMPTOMS
RESPIRATORY NEGATIVE: 1
GASTROINTESTINAL NEGATIVE: 1